# Patient Record
Sex: FEMALE | Race: WHITE | NOT HISPANIC OR LATINO | Employment: FULL TIME | URBAN - METROPOLITAN AREA
[De-identification: names, ages, dates, MRNs, and addresses within clinical notes are randomized per-mention and may not be internally consistent; named-entity substitution may affect disease eponyms.]

---

## 2023-03-09 ENCOUNTER — OFFICE VISIT (OUTPATIENT)
Dept: PRIMARY CARE | Facility: CLINIC | Age: 38
End: 2023-03-09
Payer: COMMERCIAL

## 2023-03-09 VITALS
WEIGHT: 179 LBS | RESPIRATION RATE: 16 BRPM | BODY MASS INDEX: 29.79 KG/M2 | HEART RATE: 89 BPM | DIASTOLIC BLOOD PRESSURE: 70 MMHG | OXYGEN SATURATION: 98 % | TEMPERATURE: 98.4 F | SYSTOLIC BLOOD PRESSURE: 122 MMHG

## 2023-03-09 DIAGNOSIS — H92.03 OTALGIA OF BOTH EARS: ICD-10-CM

## 2023-03-09 DIAGNOSIS — J06.9 ACUTE URI: Primary | ICD-10-CM

## 2023-03-09 PROCEDURE — 99213 OFFICE O/P EST LOW 20 MIN: CPT | Performed by: FAMILY MEDICINE

## 2023-03-09 ASSESSMENT — ENCOUNTER SYMPTOMS
WHEEZING: 0
VOMITING: 1
DIARRHEA: 1
SHORTNESS OF BREATH: 0
COUGH: 1
HEADACHES: 0
MYALGIAS: 0
FEVER: 1
NAUSEA: 1
SORE THROAT: 1

## 2023-03-09 NOTE — PROGRESS NOTES
Subjective   Patient ID: Karen Vivas is a 37 y.o. female who presents for No chief complaint on file..    Sore Throat   The current episode started 1 to 4 weeks ago. Associated symptoms include ear pain.        Review of Systems   HENT:  Positive for ear pain and sore throat.        Objective   /70   Pulse 89   Temp 36.9 °C (98.4 °F)   Resp 16   Wt 81.2 kg (179 lb)   SpO2 98%   BMI 29.79 kg/m²     Physical Exam    Assessment/Plan

## 2023-03-09 NOTE — PROGRESS NOTES
Subjective   Patient ID: Karen Vivas is a 37 y.o. female who presents for No chief complaint on file..    HPI     Review of Systems   Constitutional:  Positive for fever.        1 week ago with family being ill   HENT:  Positive for ear pain, postnasal drip and sore throat. Negative for congestion and ear discharge.         Bilateral ear pain has PNDrip   Respiratory:  Positive for cough. Negative for shortness of breath and wheezing.    Cardiovascular:  Positive for chest pain.   Gastrointestinal:  Positive for diarrhea, nausea and vomiting.   Musculoskeletal:  Negative for myalgias.   Neurological:  Negative for headaches.       Objective   /70   Pulse 89   Temp 36.9 °C (98.4 °F)   Resp 16   Wt 81.2 kg (179 lb)   SpO2 98%   BMI 29.79 kg/m²     Physical Exam  Constitutional:       General: She is not in acute distress.     Appearance: Normal appearance. She is normal weight. She is not ill-appearing.   HENT:      Head: Normocephalic and atraumatic.      Nose: Nose normal.      Mouth/Throat:      Mouth: Mucous membranes are moist.      Pharynx: Oropharynx is clear.   Neck:      Comments: L acln  Cardiovascular:      Rate and Rhythm: Normal rate and regular rhythm.      Heart sounds: Normal heart sounds.   Pulmonary:      Effort: Pulmonary effort is normal.      Breath sounds: Normal breath sounds.   Musculoskeletal:         General: Normal range of motion.      Cervical back: Neck supple.   Lymphadenopathy:      Cervical: Cervical adenopathy present.   Skin:     General: Skin is warm and dry.   Neurological:      General: No focal deficit present.      Mental Status: She is alert.         Assessment/Plan   Diagnoses and all orders for this visit:  Acute URI  Otalgia of both ears    Pt with uri and otalgia  Pt declines testing today  May use otc symptomatic care  F/up if no improvement